# Patient Record
Sex: MALE | Race: WHITE | NOT HISPANIC OR LATINO | URBAN - METROPOLITAN AREA
[De-identification: names, ages, dates, MRNs, and addresses within clinical notes are randomized per-mention and may not be internally consistent; named-entity substitution may affect disease eponyms.]

---

## 2018-03-25 ENCOUNTER — OFFICE VISIT - HEALTHEAST (OUTPATIENT)
Dept: FAMILY MEDICINE | Facility: CLINIC | Age: 23
End: 2018-03-25

## 2018-03-25 DIAGNOSIS — S93.492A SPRAIN OF ANTERIOR TALOFIBULAR LIGAMENT OF LEFT ANKLE, INITIAL ENCOUNTER: ICD-10-CM

## 2018-03-25 DIAGNOSIS — Z02.89 ENCOUNTER TO OBTAIN EXCUSE FROM WORK: ICD-10-CM

## 2018-03-31 ENCOUNTER — COMMUNICATION - HEALTHEAST (OUTPATIENT)
Dept: TELEHEALTH | Facility: CLINIC | Age: 23
End: 2018-03-31

## 2018-03-31 ENCOUNTER — OFFICE VISIT - HEALTHEAST (OUTPATIENT)
Dept: FAMILY MEDICINE | Facility: CLINIC | Age: 23
End: 2018-03-31

## 2018-03-31 DIAGNOSIS — S82.402A CLOSED LEFT FIBULAR FRACTURE: ICD-10-CM

## 2018-03-31 DIAGNOSIS — M25.572 LEFT ANKLE PAIN: ICD-10-CM

## 2018-04-11 ENCOUNTER — OFFICE VISIT - HEALTHEAST (OUTPATIENT)
Dept: FAMILY MEDICINE | Facility: CLINIC | Age: 23
End: 2018-04-11

## 2018-04-11 DIAGNOSIS — S93.402A LEFT ANKLE SPRAIN: ICD-10-CM

## 2018-04-11 DIAGNOSIS — Z76.89 ENCOUNTER TO ESTABLISH CARE: ICD-10-CM

## 2018-04-11 ASSESSMENT — MIFFLIN-ST. JEOR: SCORE: 2065.83

## 2018-04-26 ENCOUNTER — OFFICE VISIT - HEALTHEAST (OUTPATIENT)
Dept: FAMILY MEDICINE | Facility: CLINIC | Age: 23
End: 2018-04-26

## 2018-04-26 DIAGNOSIS — S93.402A LEFT ANKLE SPRAIN: ICD-10-CM

## 2018-05-10 ENCOUNTER — OFFICE VISIT - HEALTHEAST (OUTPATIENT)
Dept: FAMILY MEDICINE | Facility: CLINIC | Age: 23
End: 2018-05-10

## 2018-05-10 DIAGNOSIS — S93.402A LEFT ANKLE SPRAIN: ICD-10-CM

## 2019-02-05 ENCOUNTER — TELEPHONE (OUTPATIENT)
Dept: SCHEDULING | Age: 24
End: 2019-02-05

## 2021-06-01 VITALS — WEIGHT: 226.3 LBS | BODY MASS INDEX: 30.69 KG/M2

## 2021-06-01 VITALS — HEIGHT: 72 IN | WEIGHT: 228.8 LBS | BODY MASS INDEX: 30.99 KG/M2

## 2021-06-01 VITALS — WEIGHT: 219 LBS

## 2021-06-01 VITALS — WEIGHT: 234 LBS | BODY MASS INDEX: 31.74 KG/M2

## 2021-06-01 VITALS — WEIGHT: 226 LBS

## 2021-06-16 NOTE — PROGRESS NOTES
Subjective:      Patient ID: Demetris Hadley is a 22 y.o. male.    Chief Complaint:    HPI  Demetris Hadley is a 22 y.o. male who presents today complaining of concern for left ankle sprain.  Patient states that he was taking the garbage out when he inverted his ankle and suffered an injury.  He was seen at the [facility] emergency department on 3/23/2018.  Please see the note for specifics in the chart.  In summary, he had an x-ray which was read as negative.      He was placed in a stirrup for support and has been ambulating on it since.  He has also been using over-the-counter Tylenol and ibuprofen.  He is here right now ostensibly for a work restriction since he is unable to fully bear weight on the left lower extremity. 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      No past medical history on file.    No past surgical history on file.    No family history on file.    Social History   Substance Use Topics     Smoking status: Never Smoker     Smokeless tobacco: Never Used     Alcohol use None       Review of Systems  As above in HPI otherwise negative o  Objective:     /84 (Patient Site: Right Arm, Patient Position: Sitting, Cuff Size: Adult Large)  Pulse 81  Temp 97.9  F (36.6  C) (Oral)   Resp 20  Wt 219 lb (99.3 kg)  SpO2 97%    Physical Exam  General patient is resting company no acute distress.  Is ambulating freely into the office encounter.  Focused examination of the left lower extremity shows that the left hip and knee are nontender palpation without effusion and full range of motion per  Left ankle has an effusion.  Talar tilt testing is positive gentle inversion also reproduces pain he has no pain over the distal tibia he has pain over the anterior posterior talofibular ligaments.  Mild amount of soft tissue edema is also being seen.  Is no pain over the tarsals or metatarsals no ecchymoses he is able to bear weight in the office.  He was  wearing a stirrup style splint in place with Velcro peer    Imaging    Personally reviewed the images that the patient had from the [facility] ER.  No loose body avulsion or fracture was seen.    I personally reviewed and discussed findings with the patient.  My own personal interpretation and radiologist will over read x-rays      Assessment:     Procedures    There were no encounter diagnoses.    Plan:     I had a long discussion with the patient regarding the pathophysiology and treatment course of a ankle effusion and ankle sprain..  Patient can transition from the stirrup which is rigid fixation to a more soft compression dressing to include Ace wrap then transition to a neoprene slip on sleeve.  Additionally, he should have limited weightbearing on the left lower extremity so that he can resolve the left ankle effusion.  Have crutches at home that he can use crutch ambulation.  Ice 20 minutes on 3 times per day for analgesia and anti-inflammatory effect.  All of the left ankle above the level of heart as much as possible.  He will take 4-6 weeks for full resolution of the ankle pain.  He is ostensibly is asking for a work restriction, so we wrote a work restriction for the next week.  He can resume full activities as tolerated after the next week.  If he needs extension of restrictions for the ankle injury then I would have him follow up with his primary care provider for reevaluation and treatment.  Questions were answered to the patient's satisfaction before discharge.

## 2021-06-17 NOTE — PROGRESS NOTES
Assessment/Plan:        1. Encounter to establish care  2. Left ankle sprain  Exam findings were discussed and favored use of the walking boot for another 1-2 weeks for complete resolution of his symptoms  Close follow up with any worsening or no significant improvement as anticipated.             Subjective:    Patient ID:   Demetris Hadley is a 22 y.o. male here to establish care with a follow-up from the walk-in care clinic seen on 3/31/2018 for left ankle sprain with no fractures.  This has been treated with using a walking boot with market improvement in symptoms.  He has no other questions or concerns today       allergies, current medications, past family history, past medical history, past social history, past surgical history and problem list.    Review of Systems  A complete 10 point review of systems was obtained and is negative other than what is stated in the HPI.           Objective:   /72 (Patient Site: Left Arm, Patient Position: Sitting, Cuff Size: Adult Regular)  Pulse 66  Temp 97.9  F (36.6  C) (Oral)   Ht 6' (1.829 m)  Wt (!) 228 lb 12.8 oz (103.8 kg)  SpO2 98%  BMI 31.03 kg/m2      Physical Exam  General exam: Well-hydrated with no apparent distress  Skin normal  Left ankle foot exam: Normal to inspection with no significant tenderness to palpation, no ligament instability

## 2021-06-17 NOTE — PROGRESS NOTES
Assessment/Plan:        1. Left ankle sprain    Ongoing pain with gradual improvement     plan:       Continue with using he splint for another 2 weeks.     His workability restrictions are being updated today to stand for about 75 % of the time.   No climbing or going down stairs for period of 2 weeks from the stated above date.    follow up in 2 weeks.       Subjective:    Patient ID:   Demetris Hadley is a 22 y.o. male with a recent history of left ankle sprain comes in follow-up who originally sustained an injury to the ankle on 3/31/2018 with no fracture.  This has been managed with a walking boot with gradual improvement.  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  He continues to have pain at the ankle and not at his 100%.  Is questioning his ongoing restrictions.     allergies, current medications, past medical history and problem list.    Review of Systems  A complete 3 point review of systems was obtained and is negative other than what is stated in the HPI.           No outpatient encounter prescriptions on file as of 4/26/2018.     No facility-administered encounter medications on file as of 4/26/2018.          Objective:   /86 (Patient Site: Right Arm, Patient Position: Sitting, Cuff Size: Adult Regular)  Pulse 75  Temp 98.1  F (36.7  C) (Oral)   Wt (!) 226 lb 4.8 oz (102.6 kg)  SpO2 97%  BMI 30.69 kg/m2      Physical Exam  General exam: No apparent distress well-hydrated  Ankle exam: No significant swelling, mild palpable tenderness no other deformity  Skin exam normal

## 2021-06-18 NOTE — PROGRESS NOTES
Assessment/Plan:      1. Left ankle sprain  Improved   He may now return to work without restrictions as of 5/10/2018.   follow up prn.             Subjective:    Patient ID:   Demetris Hadley is a 22 y.o. male comes in follow up of his left ankle.   He feels that the ankle is now completely improved, able to bear weight and walk around without lingering or increasing pain.    He'd like his work restrictions removed.       Review of Systems  Constitutional: negative  Musculoskeletal:see HPI        No outpatient encounter prescriptions on file as of 5/10/2018.     No facility-administered encounter medications on file as of 5/10/2018.          Objective:   /82 (Patient Site: Right Arm, Patient Position: Sitting, Cuff Size: Adult Regular)  Pulse 93  Temp 98.5  F (36.9  C) (Oral)   Wt (!) 234 lb (106.1 kg)  SpO2 97%  BMI 31.74 kg/m2      Physical Exam  General: no distress, and well hydrated.   L. Ankle: nl

## 2021-06-26 NOTE — PROGRESS NOTES
Progress Notes by Greg Rubalcava DO at 3/31/2018  1:50 PM     Author: Greg Rubalcava DO Service: -- Author Type: Physician    Filed: 4/2/2018  8:04 AM Encounter Date: 3/31/2018 Status: Signed    : Greg Rubalcava DO (Physician)       Chief Complaint   Patient presents with   ? Ankle Pain     still having pain, unstable, still has swelling, can walk on it with brace, went to [facility] a few days ago     History of Present Illness: Nursing notes reviewed.  Patient is seen in clinic today because of persistent discomfort in his left ankle, particularly just proximal from his lateral malleolus region.  He gets partial relief of discomfort by wearing an elastic ankle support.  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    Review of systems: See history of present illness, otherwise negative.     No current outpatient prescriptions on file.     No current facility-administered medications for this visit.        No past medical history on file.   No past surgical history on file.   Social History     Social History   ? Marital status: Single     Spouse name: N/A   ? Number of children: N/A   ? Years of education: N/A     Social History Main Topics   ? Smoking status: Never Smoker   ? Smokeless tobacco: Never Used   ? Alcohol use None   ? Drug use: None   ? Sexual activity: Not Asked     Other Topics Concern   ? None     Social History Narrative    03/23/18: Pt reports he works for the [employer] and spends a lot of time on his feet.        History   Smoking Status   ? Never Smoker   Smokeless Tobacco   ? Never Used      Exam:   Blood pressure 136/88, pulse 70, weight (!) 226 lb (102.5 kg), SpO2 98 %.    EXAM:   General: Vital signs reviewed. Patient is in no acute appearing distress. Breathing is non labored appearing. Patient is alert and oriented x 3.   Examination of left lower extremity shows moderate edema throughout the left ankle,  and the distal tibia/fibular area.  No discoloration noted.  Patient has increased skin temperature over his distal fibular area.  He has tenderness in this area off.    I reviewed the x-ray studies with patient at time of exam.  A faint lucency is noted by me in distal fibula which correlates with a slight bulge noted of bone surface here.  This correlates with patient's area of discomfort.    Patient was fitted with a walking boot.  He got additional relief with wearing of this.  I suggested he can use this in combination with his elastic ankle support.    Assessment/Plan   1. Left ankle pain  XR Tibia and Fibula Left    XR Tibia and Fibula Left    XR Ankle Left 3 or More VWS    XR Ankle Left 3 or More VWS    CANCELED: XR Ankle Left 3 or More VWS   2. Closed left fibular fracture  Walking boot       Patient Instructions     Also see info below. Wear the boot while awake. Use ibuprofen for pain relief. Try to follow up with a primary provider in 2 weeks.    Understanding an Ankle Fracture     The ankle is formed by bones in the lower leg (tibia and fibula) and the bone on top of the foot (talus). When you have a fracture of the ankle, it means that one or more of the bones in the ankle are broken. The bone may be cracked, broken into two or more pieces, or even shattered. The pieces of bone may be lined up or they may have moved out of place. Sometimes, the bone may break through the skin. Nearby ligaments may also be damaged. Depending on how badly the bone is broken, healing may take a few months or longer.   What causes an ankle fracture?  Ankle fractures are often caused from severely twisting or rolling the ankle. They may also be caused from a fall, blow, accident, or sports injury.  Symptoms of an ankle fracture  Symptoms can include pain, swelling, and bruising. If the bone breaks through the skin, bleeding at the site can also occur. The ankle may look crooked, deformed, or bent. Also, it may be hard to  move or use the ankle and foot as you would normally.  Treating an ankle fracture  Treatment for an ankle fracture depends on where the bone is broken and how serious the break is. If needed, the bone is put back into place. This may be done with or without surgery. If surgery is needed, the surgeon may use devices such as pins, plates, or screws to hold the bone together. Usually, you will wear a splint, brace, or short leg cast to keep the bone in place and protect it from injury during healing. Other treatments may also be used to help reduce symptoms or regain function. These include:    Rest. You may need to avoid walking or putting any weight on the broken ankle for a period of time. Severe fractures need a longer limit on weight-bearing activities.    Cold packs. Putting an ice pack over the injured area may help reduce swelling and pain.    Compression. An elastic bandage may be wrapped around the ankle to help reduce swelling.    Elevation. Propping up the ankle so that it is above your heart may ease swelling.    Pain medicines. Prescription or over-the-counter pain medicines may help reduce pain and swelling. If needed, stronger pain medicines may be prescribed.    Exercises. Your healthcare provider may give you certain exercises to do at home or with a physical therapist. These help restore strength, flexibility, and range of motion in the ankle and foot.  Possible complications of an ankle fracture  These can include:    Poor healing of the bone    Weakness, stiffness, or loss of range of motion in the ankle    Osteoarthritis in the ankle  When to call your healthcare provider  Call your healthcare provider right away if you have any of these:    Fever of 100.4 F (38 C) or higher, or as directed    Symptoms that dont get better with treatment, or get worse    Numbness, tingling, or coldness in your foot or toes    Toenails that turn blue or grey in color    A splint, brace, or cast that is damaged or  feels too tight or loose    Unusual redness, warmth, swelling, bleeding, or drainage from any wounds or incision sites    New symptoms   Date Last Reviewed: 3/10/2016    4052-7796 [employer]. [address] [place] [address], [place] [address]. All rights reserved. This information is not intended as a substitute for professional medical care. Always follow your healthcare professional's instructions.           Greg Rubalcava,